# Patient Record
Sex: FEMALE | Race: WHITE | NOT HISPANIC OR LATINO | Employment: FULL TIME | URBAN - METROPOLITAN AREA
[De-identification: names, ages, dates, MRNs, and addresses within clinical notes are randomized per-mention and may not be internally consistent; named-entity substitution may affect disease eponyms.]

---

## 2021-10-28 DIAGNOSIS — M25.561 RIGHT KNEE PAIN, UNSPECIFIED CHRONICITY: Primary | ICD-10-CM

## 2021-10-29 ENCOUNTER — HOSPITAL ENCOUNTER (OUTPATIENT)
Dept: RADIOLOGY | Facility: HOSPITAL | Age: 46
Discharge: HOME | End: 2021-10-29
Attending: FAMILY MEDICINE
Payer: COMMERCIAL

## 2021-10-29 ENCOUNTER — OFFICE VISIT (OUTPATIENT)
Dept: ORTHOPEDICS | Facility: CLINIC | Age: 46
End: 2021-10-29
Payer: COMMERCIAL

## 2021-10-29 DIAGNOSIS — M25.561 RIGHT KNEE PAIN, UNSPECIFIED CHRONICITY: ICD-10-CM

## 2021-10-29 DIAGNOSIS — S83.511A RUPTURE OF ANTERIOR CRUCIATE LIGAMENT OF RIGHT KNEE, INITIAL ENCOUNTER: Primary | ICD-10-CM

## 2021-10-29 DIAGNOSIS — M25.461 EFFUSION OF RIGHT KNEE: ICD-10-CM

## 2021-10-29 PROCEDURE — 73564 XR KNEE ORTHO RIGHT WITH FLEXION: ICD-10-PCS | Mod: 26,RT,, | Performed by: RADIOLOGY

## 2021-10-29 PROCEDURE — 99203 OFFICE O/P NEW LOW 30 MIN: CPT | Mod: S$GLB,,, | Performed by: ORTHOPAEDIC SURGERY

## 2021-10-29 PROCEDURE — 73564 X-RAY EXAM KNEE 4 OR MORE: CPT | Mod: 26,RT,, | Performed by: RADIOLOGY

## 2021-10-29 PROCEDURE — 99999 PR PBB SHADOW E&M-EST. PATIENT-LVL II: ICD-10-PCS | Mod: PBBFAC,,, | Performed by: ORTHOPAEDIC SURGERY

## 2021-10-29 PROCEDURE — 73562 X-RAY EXAM OF KNEE 3: CPT | Mod: 26,LT,, | Performed by: RADIOLOGY

## 2021-10-29 PROCEDURE — 99999 PR PBB SHADOW E&M-EST. PATIENT-LVL II: CPT | Mod: PBBFAC,,, | Performed by: ORTHOPAEDIC SURGERY

## 2021-10-29 PROCEDURE — 99203 PR OFFICE/OUTPT VISIT, NEW, LEVL III, 30-44 MIN: ICD-10-PCS | Mod: S$GLB,,, | Performed by: ORTHOPAEDIC SURGERY

## 2021-10-29 PROCEDURE — 73562 XR KNEE ORTHO RIGHT WITH FLEXION: ICD-10-PCS | Mod: 26,LT,, | Performed by: RADIOLOGY

## 2021-10-29 PROCEDURE — 73564 X-RAY EXAM KNEE 4 OR MORE: CPT | Mod: TC,RT

## 2021-10-29 RX ORDER — ESCITALOPRAM OXALATE 10 MG/1
10 TABLET ORAL DAILY
COMMUNITY
Start: 2021-09-09

## 2021-10-29 RX ORDER — DEXTROAMPHETAMINE SACCHARATE, AMPHETAMINE ASPARTATE, DEXTROAMPHETAMINE SULFATE AND AMPHETAMINE SULFATE 5; 5; 5; 5 MG/1; MG/1; MG/1; MG/1
1 TABLET ORAL 2 TIMES DAILY
COMMUNITY
Start: 2021-10-24

## 2021-11-01 ENCOUNTER — HOSPITAL ENCOUNTER (OUTPATIENT)
Dept: RADIOLOGY | Facility: HOSPITAL | Age: 46
Discharge: HOME | End: 2021-11-01
Attending: ORTHOPAEDIC SURGERY
Payer: COMMERCIAL

## 2021-11-01 DIAGNOSIS — S83.511A RUPTURE OF ANTERIOR CRUCIATE LIGAMENT OF RIGHT KNEE, INITIAL ENCOUNTER: ICD-10-CM

## 2021-11-01 DIAGNOSIS — M89.9 BONE LESION: Primary | ICD-10-CM

## 2021-11-01 DIAGNOSIS — M25.461 EFFUSION OF RIGHT KNEE: ICD-10-CM

## 2021-11-01 PROCEDURE — 73721 MRI JNT OF LWR EXTRE W/O DYE: CPT | Mod: TC,PO,RT

## 2021-11-01 PROCEDURE — 73721 MRI KNEE WITHOUT CONTRAST RIGHT: ICD-10-PCS | Mod: 26,RT,, | Performed by: RADIOLOGY

## 2021-11-01 PROCEDURE — 73721 MRI JNT OF LWR EXTRE W/O DYE: CPT | Mod: 26,RT,, | Performed by: RADIOLOGY

## 2021-11-03 ENCOUNTER — OFFICE VISIT (OUTPATIENT)
Dept: ORTHOPEDICS | Facility: CLINIC | Age: 46
End: 2021-11-03
Payer: COMMERCIAL

## 2021-11-03 DIAGNOSIS — S83.511A RUPTURE OF ANTERIOR CRUCIATE LIGAMENT OF RIGHT KNEE, INITIAL ENCOUNTER: Primary | ICD-10-CM

## 2021-11-03 PROCEDURE — 99213 OFFICE O/P EST LOW 20 MIN: CPT | Mod: S$GLB,,, | Performed by: ORTHOPAEDIC SURGERY

## 2021-11-03 PROCEDURE — 99213 PR OFFICE/OUTPT VISIT, EST, LEVL III, 20-29 MIN: ICD-10-PCS | Mod: S$GLB,,, | Performed by: ORTHOPAEDIC SURGERY

## 2021-11-03 PROCEDURE — 99999 PR PBB SHADOW E&M-EST. PATIENT-LVL III: CPT | Mod: PBBFAC,,, | Performed by: ORTHOPAEDIC SURGERY

## 2021-11-03 PROCEDURE — 99999 PR PBB SHADOW E&M-EST. PATIENT-LVL III: ICD-10-PCS | Mod: PBBFAC,,, | Performed by: ORTHOPAEDIC SURGERY

## 2021-11-10 ENCOUNTER — CLINICAL SUPPORT (OUTPATIENT)
Dept: REHABILITATION | Facility: HOSPITAL | Age: 46
End: 2021-11-10
Attending: ORTHOPAEDIC SURGERY
Payer: COMMERCIAL

## 2021-11-10 DIAGNOSIS — S83.511A RUPTURE OF ANTERIOR CRUCIATE LIGAMENT OF RIGHT KNEE, INITIAL ENCOUNTER: ICD-10-CM

## 2021-11-10 DIAGNOSIS — M25.561 ACUTE PAIN OF RIGHT KNEE: ICD-10-CM

## 2021-11-10 DIAGNOSIS — M25.661 JOINT STIFFNESS OF KNEE, RIGHT: ICD-10-CM

## 2021-11-10 PROCEDURE — 97161 PT EVAL LOW COMPLEX 20 MIN: CPT

## 2021-11-10 PROCEDURE — 97110 THERAPEUTIC EXERCISES: CPT

## 2021-11-10 PROCEDURE — 97140 MANUAL THERAPY 1/> REGIONS: CPT

## 2021-11-18 ENCOUNTER — CLINICAL SUPPORT (OUTPATIENT)
Dept: REHABILITATION | Facility: HOSPITAL | Age: 46
End: 2021-11-18
Payer: COMMERCIAL

## 2021-11-18 DIAGNOSIS — M25.661 JOINT STIFFNESS OF KNEE, RIGHT: ICD-10-CM

## 2021-11-18 DIAGNOSIS — M25.561 ACUTE PAIN OF RIGHT KNEE: ICD-10-CM

## 2021-11-18 PROCEDURE — 97140 MANUAL THERAPY 1/> REGIONS: CPT

## 2021-11-18 PROCEDURE — 97110 THERAPEUTIC EXERCISES: CPT

## 2021-11-19 ENCOUNTER — CLINICAL SUPPORT (OUTPATIENT)
Dept: REHABILITATION | Facility: HOSPITAL | Age: 46
End: 2021-11-19
Payer: COMMERCIAL

## 2021-11-19 DIAGNOSIS — M25.661 JOINT STIFFNESS OF KNEE, RIGHT: ICD-10-CM

## 2021-11-19 DIAGNOSIS — M25.561 ACUTE PAIN OF RIGHT KNEE: ICD-10-CM

## 2021-11-19 PROCEDURE — 97140 MANUAL THERAPY 1/> REGIONS: CPT

## 2021-11-19 PROCEDURE — 97110 THERAPEUTIC EXERCISES: CPT

## 2021-11-23 ENCOUNTER — CLINICAL SUPPORT (OUTPATIENT)
Dept: REHABILITATION | Facility: HOSPITAL | Age: 46
End: 2021-11-23
Payer: COMMERCIAL

## 2021-11-23 DIAGNOSIS — M25.661 JOINT STIFFNESS OF KNEE, RIGHT: ICD-10-CM

## 2021-11-23 DIAGNOSIS — M25.561 ACUTE PAIN OF RIGHT KNEE: ICD-10-CM

## 2021-11-23 PROCEDURE — 97110 THERAPEUTIC EXERCISES: CPT

## 2021-11-23 PROCEDURE — 97140 MANUAL THERAPY 1/> REGIONS: CPT

## 2021-11-24 ENCOUNTER — PATIENT MESSAGE (OUTPATIENT)
Dept: ORTHOPEDICS | Facility: CLINIC | Age: 46
End: 2021-11-24
Payer: COMMERCIAL

## 2021-11-24 ENCOUNTER — TELEPHONE (OUTPATIENT)
Dept: ORTHOPEDICS | Facility: CLINIC | Age: 46
End: 2021-11-24
Payer: COMMERCIAL

## 2021-12-02 ENCOUNTER — OFFICE VISIT (OUTPATIENT)
Dept: ORTHOPEDICS | Facility: CLINIC | Age: 46
End: 2021-12-02
Payer: COMMERCIAL

## 2021-12-02 VITALS — HEIGHT: 65 IN | WEIGHT: 150 LBS | BODY MASS INDEX: 24.99 KG/M2

## 2021-12-02 DIAGNOSIS — S83.511D RUPTURE OF ANTERIOR CRUCIATE LIGAMENT OF RIGHT KNEE, SUBSEQUENT ENCOUNTER: Primary | ICD-10-CM

## 2021-12-02 PROCEDURE — 99213 PR OFFICE/OUTPT VISIT, EST, LEVL III, 20-29 MIN: ICD-10-PCS | Mod: S$GLB,,, | Performed by: ORTHOPAEDIC SURGERY

## 2021-12-02 PROCEDURE — 99999 PR PBB SHADOW E&M-EST. PATIENT-LVL III: CPT | Mod: PBBFAC,,, | Performed by: ORTHOPAEDIC SURGERY

## 2021-12-02 PROCEDURE — 99213 OFFICE O/P EST LOW 20 MIN: CPT | Mod: S$GLB,,, | Performed by: ORTHOPAEDIC SURGERY

## 2021-12-02 PROCEDURE — 99999 PR PBB SHADOW E&M-EST. PATIENT-LVL III: ICD-10-PCS | Mod: PBBFAC,,, | Performed by: ORTHOPAEDIC SURGERY

## 2021-12-07 ENCOUNTER — CLINICAL SUPPORT (OUTPATIENT)
Dept: REHABILITATION | Facility: HOSPITAL | Age: 46
End: 2021-12-07
Payer: COMMERCIAL

## 2021-12-07 DIAGNOSIS — M25.661 JOINT STIFFNESS OF KNEE, RIGHT: ICD-10-CM

## 2021-12-07 DIAGNOSIS — M25.561 ACUTE PAIN OF RIGHT KNEE: ICD-10-CM

## 2021-12-07 PROCEDURE — 97110 THERAPEUTIC EXERCISES: CPT

## 2021-12-07 PROCEDURE — 97140 MANUAL THERAPY 1/> REGIONS: CPT

## 2021-12-14 ENCOUNTER — CLINICAL SUPPORT (OUTPATIENT)
Dept: REHABILITATION | Facility: HOSPITAL | Age: 46
End: 2021-12-14
Payer: COMMERCIAL

## 2021-12-14 DIAGNOSIS — M25.661 JOINT STIFFNESS OF KNEE, RIGHT: ICD-10-CM

## 2021-12-14 DIAGNOSIS — M25.561 ACUTE PAIN OF RIGHT KNEE: ICD-10-CM

## 2021-12-14 PROCEDURE — 97110 THERAPEUTIC EXERCISES: CPT

## 2021-12-14 PROCEDURE — 97140 MANUAL THERAPY 1/> REGIONS: CPT

## 2021-12-16 ENCOUNTER — CLINICAL SUPPORT (OUTPATIENT)
Dept: REHABILITATION | Facility: HOSPITAL | Age: 46
End: 2021-12-16
Payer: COMMERCIAL

## 2021-12-16 DIAGNOSIS — M25.661 JOINT STIFFNESS OF KNEE, RIGHT: ICD-10-CM

## 2021-12-16 DIAGNOSIS — M25.561 ACUTE PAIN OF RIGHT KNEE: ICD-10-CM

## 2021-12-16 PROCEDURE — 97140 MANUAL THERAPY 1/> REGIONS: CPT

## 2021-12-16 PROCEDURE — 97110 THERAPEUTIC EXERCISES: CPT

## 2021-12-23 ENCOUNTER — CLINICAL SUPPORT (OUTPATIENT)
Dept: REHABILITATION | Facility: HOSPITAL | Age: 46
End: 2021-12-23
Payer: COMMERCIAL

## 2021-12-23 DIAGNOSIS — M25.661 JOINT STIFFNESS OF KNEE, RIGHT: ICD-10-CM

## 2021-12-23 DIAGNOSIS — M25.561 ACUTE PAIN OF RIGHT KNEE: ICD-10-CM

## 2021-12-23 PROCEDURE — 97110 THERAPEUTIC EXERCISES: CPT

## 2021-12-28 ENCOUNTER — CLINICAL SUPPORT (OUTPATIENT)
Dept: REHABILITATION | Facility: HOSPITAL | Age: 46
End: 2021-12-28
Payer: COMMERCIAL

## 2021-12-28 DIAGNOSIS — M25.561 ACUTE PAIN OF RIGHT KNEE: ICD-10-CM

## 2021-12-28 DIAGNOSIS — M25.661 JOINT STIFFNESS OF KNEE, RIGHT: ICD-10-CM

## 2021-12-28 PROCEDURE — 97110 THERAPEUTIC EXERCISES: CPT

## 2021-12-30 ENCOUNTER — CLINICAL SUPPORT (OUTPATIENT)
Dept: REHABILITATION | Facility: HOSPITAL | Age: 46
End: 2021-12-30
Payer: COMMERCIAL

## 2021-12-30 DIAGNOSIS — M25.561 ACUTE PAIN OF RIGHT KNEE: ICD-10-CM

## 2021-12-30 DIAGNOSIS — M25.661 JOINT STIFFNESS OF KNEE, RIGHT: ICD-10-CM

## 2021-12-30 PROCEDURE — 97110 THERAPEUTIC EXERCISES: CPT

## 2022-01-06 ENCOUNTER — CLINICAL SUPPORT (OUTPATIENT)
Dept: REHABILITATION | Facility: HOSPITAL | Age: 47
End: 2022-01-06
Payer: COMMERCIAL

## 2022-01-06 DIAGNOSIS — M25.661 JOINT STIFFNESS OF KNEE, RIGHT: ICD-10-CM

## 2022-01-06 DIAGNOSIS — M25.561 ACUTE PAIN OF RIGHT KNEE: ICD-10-CM

## 2022-01-06 PROCEDURE — 97110 THERAPEUTIC EXERCISES: CPT

## 2022-01-06 NOTE — PROGRESS NOTES
Outpatient Therapy Updated Plan of Care     Visit Date: 1/6/2022    Name: Lizeth Willis  Clinic Number: [id number]    Therapy Diagnosis:   Encounter Diagnoses   Name Primary?    Joint stiffness of knee, right     Acute pain of right knee      Physician: Parth Bertrand MD    Physician Orders: PT Eval and Treat  Medical Diagnosis from Referral: Rupture of anterior cruciate ligament of right knee, initial encounter [S83.511A]  Evaluation Date: 11/10/2021  Authorization Period Expiration: 11/03/2022  Plan of Care Expiration: 01/05/2022  Progress Note Due: 01/15/2022  Visit # / Visits authorized: 1/20 (11 Episode Visits)    Current Certification Period:  01/01/2022 to 06/01/2022    Precautions: Standard  Functional Level Prior to Evaluation:  Patient was independent with all ADLs. Patient was able to tolerate all exercise, walking, and other day to day activities without pain and/or feelings of instability.    Subjective     Update: Currently, Lizeth feels that she is progressing well toward her goals. She believes she still remains limited with some of her usual ADLs that require balance and or single leg strength. Additionally, she has some feelings of instability when walking her dog - albeit they are decreasing.    Objective     Update:     Range of Motion:      Knee Right active Right Passive Left Active Left passive Goal   Flexion 140 142 145 147 135 deg.   Extension Missing 2 degrees. 3 degrees hyper 3 degrees hyper 3 degrees hyper 0 deg.         Lower Extremity Strength:     Right LE   Left LE   Goal   Knee extension: 4/5 Knee extension: 4/5 5/5   Knee flexion: 4/5 Knee flexion: 4/5 5/5   Hip flexion: 4/5 Hip flexion: 4/5 5/5   Hip extension:  4/5 Hip extension: 4/5 5/5   Hip abduction: 4/5 Hip abduction: 4/5 5/5            Assessment     Update: Lizeth is progressing well with physical therapy, with no complaints of pain or discomfort and significant improvements noted in lateral hip stability  and gross lower extremity strength and range of motion since initial evaluation; please see exam for details. Lizeth continues to have difficulty with achieving full active flexion and lacks with balance and gross lower extremity strength.  FOTO scores noted above indicate the patients perceived functional levels are improving since prior assessment. The above impairments and functional deficits will continue to be addressed over the course of this plan of care. Lizeth was educated on continued progression of PT, expectations for the duration of care, updates on goals set at initial evaluation, and home exercise program.  Lizeth will continue to benefit from physical therapy in order to further address goals, maximize function and quality of life.    Goals:     SHORT TERM GOALS:  4 weeks 11/10/2021   1. Recent signs and systems trend is improving in order to progress towards LTG's.  MET   2. Patient will be independent with HEP in order to further progress and return to maximal function.  MET   3. Pain rating at Worst: 5/10 in order to progress towards increased independence with activity.  MET   4. Patient will be able to correct postural deviations in sitting and standing, to decrease pain and promote postural awareness for injury prevention.   MET      LONG TERM GOALS: 8 weeks 11/10/2021   1. Patient will return to normal ADL, recreational, and work related activities with less pain and limitation.  MET    2. Patient will improve AROM to stated goals in order to return to maximal functional potential.  Partially Met    3. Patient will improve Strength to stated goals of appropriate musculature in order to improve functional independence.   Partially Met   4. Pain Rating at Best: 1/10 to improve Quality of Life.   MET   5. Patient will meet predicted functional outcome (FOTO) score: 78% to increase self-worth & perceived functional ability.  MET   6. Patient will have met/partially met personal goal of:  returning to walking for leisure.  MET        Long Term Goal Status:   continue per initial plan of care.  Reasons for Recertification of Therapy:   Pt will continue to benefit from skilled outpatient physical therapy to address the deficits listed in the problem list box on initial evaluation, provide pt/family education and to maximize pt's level of independence in the home and community environment.     Plan     Updated Certification Period: 1/6/2022 to 01/21/2022  Recommended Treatment Plan: 2 times per week for 2 weeks: Gait Training, Manual Therapy, Moist Heat/ Ice, Neuromuscular Re-ed, Patient Education, Self Care, Therapeutic Exercise and Therapeutic Activites    Efrain Perales, PT  1/6/2022      I CERTIFY THE NEED FOR THESE SERVICES FURNISHED UNDER THIS PLAN OF TREATMENT AND WHILE UNDER MY CARE    Physician's comments:        Physician's Signature: ___________________________________________________

## 2022-01-06 NOTE — PROGRESS NOTES
[facility]   Physical Therapy Daily Note     Name: Lizeth Willis  Clinic Number: [id number]    Therapy Diagnosis:   Encounter Diagnoses   Name Primary?    Joint stiffness of knee, right     Acute pain of right knee      Physician: Parth Bertrand MD    Visit Date: 1/6/2022    Physician Orders: PT Eval and Treat  Medical Diagnosis from Referral: Rupture of anterior cruciate ligament of right knee, initial encounter [S83.511A]  Evaluation Date: 11/10/2021  Authorization Period Expiration: 11/03/2022  Plan of Care Expiration: 01/05/2022  Progress Note Due: 01/15/2022  Visit # / Visits authorized: 1/20 (New Year) - 10 Episode Visits  FOTO: 2/ 3      Precautions: Standard    PTA Visit #: 0/5     Time In: 9:10 am  Time Out: 9:53 am   Total Billable Time: 39 minutes     SUBJECTIVE     Patient reports: some soreness in her right knee with the weather change but overall she is continuing to feel progression  He/She was compliant with home exercise program.  Response to previous treatment: minimal to no muscle soreness  Functional change: some improvement in extension and with walking her dog    Pain: 0/10     Location: right knee    OBJECTIVE     Objective Measures updated at progress report unless specified.     TREATMENT     Lizeth received the treatments listed below:     MANUAL THERAPY TECHNIQUES including Joint mobilizations were applied to right knee for 0 minutes.    Manual Intervention Performed Today    Instrument Assisted Soft-Tissue Mobilization   left hamstrings, left quadriceps, left peripatellar region    Joint Mobilizations  Tibiofemoral extension ; patellar mobilization all directions   Mobilization with movement          Functional Dry Needling       Plan for Next Visit: prn       THERAPEUTIC EXERCISES to develop  strength, endurance, ROM, flexibility, posture and core stabilization for 39 minutes including assessment, patient education, and the following  "exercises.    Intervention    Performed Today Sets/Reps/Resistance     Upright Bike x 3 min - Level 3 - Lower Extremity endurance and strength   Supine Hangs - Chair Hang  5 min - 5# KB   Prone Hamstring Curls x 3x10 - 3#   Side lying clamshells x 3x10 - Green Theraband    Glute Bridge x 3x10 - Green Theraband    side lying hip abduction  2x10 - 2#    Straight Leg Raise  2x12 - 2#   Nautilus Knee Extension x 3x8 - up with 2 down with 1 - 1 plate   Nautilus Hamstring Curls x 3x8 - 0 Plates   Step Ups  8" Step - 3x10   Monster Walks x 3 Laps Blue Theraband ~ 20 feet    Lateral Band Walks x 3 Laps - Blue Theraband - ~20 feet    Single Leg Squat to Box x 22" - 3x12 - with kickstand    Squats to Box + Theraband x 3x10 - Blue Theraband - 20"   Single Leg Balance + MOBO x 2x30" - Evens and Odds    Split Squats x 3x6 - each    Backwards walking to improve extension  3 Laps - 50 feet - Turf   Gastroc Stretch and Soleus Stretch - Slantboard x 2x30" - right      Plan for Next Visit:        PATIENT EDUCATION AND HOME EXERCISES     Home Exercises Provided and Patient Education Provided     Education provided: (included in therex) minutes  Importance of achieving full knee extension during gait  Progression of home exercise program     Written Home Exercises Provided: continue prior home exercise program .  Exercises were reviewed and Lizeth was able to demonstrate them prior to the end of the session.  Lizeth demonstrated good  understanding of the education provided. See EMR under Patient Instructions for exercises provided during therapy sessions.      ASSESSMENT     Patient tolerated the addition of all exercises with reports of only muscle fatigue. Addition of hamstring curl in order to improve hamstring strength. Addition of split squats in order to improve functional lower extremity strength. Continue to progress plan of care as tolerated and begin to discuss discharge planning.    Lizeth is progressing well towards " her goals.   Pt prognosis is Excellent.     Pt will continue to benefit from skilled outpatient physical therapy to address the deficits listed in the problem list box on initial evaluation, provide pt/family education and to maximize pt's level of independence in the home and community environment.     Pt's spiritual, cultural and educational needs considered and pt agreeable to plan of care and goals.     Anticipated Barriers for therapy: coping style    SHORT TERM GOALS:  4 weeks 11/10/2021   1. Recent signs and systems trend is improving in order to progress towards LTG's.  PC   2. Patient will be independent with HEP in order to further progress and return to maximal function.  PC   3. Pain rating at Worst: 5/10 in order to progress towards increased independence with activity.  PC   4. Patient will be able to correct postural deviations in sitting and standing, to decrease pain and promote postural awareness for injury prevention.   PC      LONG TERM GOALS: 8 weeks 11/10/2021   1. Patient will return to normal ADL, recreational, and work related activities with less pain and limitation.   PC   2. Patient will improve AROM to stated goals in order to return to maximal functional potential.   PC   3. Patient will improve Strength to stated goals of appropriate musculature in order to improve functional independence.   PC   4. Pain Rating at Best: 1/10 to improve Quality of Life.   PC   5. Patient will meet predicted functional outcome (FOTO) score: 78% to increase self-worth & perceived functional ability.  PC   6. Patient will have met/partially met personal goal of: returning to walking for leisure.  PC         Goals Key:  PC= progressing/continue; PM= partially met;        DC= discontinue    PLAN     Continue Plan of Care (POC) and progress per patient tolerance. See treatment section for details on planned progressions next session.      Efrain Perales, PT

## 2022-01-13 ENCOUNTER — CLINICAL SUPPORT (OUTPATIENT)
Dept: REHABILITATION | Facility: HOSPITAL | Age: 47
End: 2022-01-13
Payer: COMMERCIAL

## 2022-01-13 DIAGNOSIS — M25.661 JOINT STIFFNESS OF KNEE, RIGHT: ICD-10-CM

## 2022-01-13 DIAGNOSIS — M25.561 ACUTE PAIN OF RIGHT KNEE: ICD-10-CM

## 2022-01-13 PROCEDURE — 97110 THERAPEUTIC EXERCISES: CPT

## 2022-01-13 NOTE — PROGRESS NOTES
[facility]   Physical Therapy Daily Note     Name: Lizeth Willis  Clinic Number: [id number]    Therapy Diagnosis:   Encounter Diagnoses   Name Primary?    Joint stiffness of knee, right     Acute pain of right knee      Physician: Parth Bertrand MD    Visit Date: 1/13/2022    Physician Orders: PT Eval and Treat  Medical Diagnosis from Referral: Rupture of anterior cruciate ligament of right knee, initial encounter [S83.511A]  Evaluation Date: 11/10/2021  Authorization Period Expiration: 11/03/2022  Plan of Care Expiration: 06/01/2022  Progress Note Due: 01/15/2022  Visit # / Visits authorized: 2/20 (New Year) - 11 Episode Visits  FOTO: 2/ 3      Precautions: Standard    PTA Visit #: 0/5     Time In: 9:10 am  Time Out: 9:55 am   Total Billable Time: 38 minutes     SUBJECTIVE     Patient reports: patient reports that her knee has been doing well - no adverse reactions to last treatment session.    He/She was compliant with home exercise program.  Response to previous treatment: minimal to no muscle soreness  Functional change: some improvement in extension and with walking her dog    Pain: 0/10     Location: right knee    OBJECTIVE     Objective Measures updated at progress report unless specified.     TREATMENT     Lizeth received the treatments listed below:     MANUAL THERAPY TECHNIQUES including Joint mobilizations were applied to right knee for 0 minutes.    Manual Intervention Performed Today    Instrument Assisted Soft-Tissue Mobilization   left hamstrings, left quadriceps, left peripatellar region    Joint Mobilizations  Tibiofemoral extension ; patellar mobilization all directions   Mobilization with movement          Functional Dry Needling       Plan for Next Visit: prn       THERAPEUTIC EXERCISES to develop  strength, endurance, ROM, flexibility, posture and core stabilization for 38 minutes including assessment, patient education, and the following  "exercises.    Intervention    Performed Today Sets/Reps/Resistance     Upright Bike x 5 min - Level 5 - Lower Extremity endurance and strength   Supine Hangs - Chair Hang  5 min - 5# KB   Prone Hamstring Curls  3x10 - 3#   Side lying clamshells x 3x10 - Blue Theraband    Glute Bridge  3x10 - Green Theraband    side lying hip abduction  2x10 - 2#    Straight Leg Raise  2x12 - 2#   Nautilus Knee Extension x 3x8 - 2 Plates   Nautilus Hamstring Curls HELD 3x8 - 0 Plates   Step Down - heel tap x 4" Step - 3x8   Monster Walks x 3 Laps Blue Theraband ~ 20 feet    Lateral Band Walks x 3 Laps - Blue Theraband - ~20 feet    Single Leg Squat to Box x 24" - 3x8 - without kickstand    Squats to Box + Theraband + Kettlebell x 3x10 - Blue Theraband - 18" - 15# KB   Single Leg Balance + Kettlebell Pass x 2x20    Single Leg Balance + MOBO x 2x30" - Evens and Odds    Split Squats x 1x8 - each    Backwards walking to improve extension x 2 Laps - 50 feet - Turf   Gastroc Stretch and Soleus Stretch - Slantboard x 2x30" - right      Plan for Next Visit:        PATIENT EDUCATION AND HOME EXERCISES     Home Exercises Provided and Patient Education Provided     Education provided: (included in therex) minutes  Importance of achieving full knee extension during gait  Progression of home exercise program     Written Home Exercises Provided: continue prior home exercise program .  Exercises were reviewed and Lizeth was able to demonstrate them prior to the end of the session.  Lizeth demonstrated good  understanding of the education provided. See EMR under Patient Instructions for exercises provided during therapy sessions.      ASSESSMENT     Patient tolerated the addition of all exercises with reports of only muscle fatigue. Patient has made significant progress since her initial evaluation. Progressing home exercise program today - patient will perform home exercise program next week and come for one more follow up visit to be " discharged.    Lizeth is progressing well towards her goals.     Pt prognosis is Excellent.     Pt will continue to benefit from skilled outpatient physical therapy to address the deficits listed in the problem list box on initial evaluation, provide pt/family education and to maximize pt's level of independence in the home and community environment.     Pt's spiritual, cultural and educational needs considered and pt agreeable to plan of care and goals.     Anticipated Barriers for therapy: coping style    SHORT TERM GOALS:  4 weeks 11/10/2021   1. Recent signs and systems trend is improving in order to progress towards LTG's.  PC   2. Patient will be independent with HEP in order to further progress and return to maximal function.  PC   3. Pain rating at Worst: 5/10 in order to progress towards increased independence with activity.  PC   4. Patient will be able to correct postural deviations in sitting and standing, to decrease pain and promote postural awareness for injury prevention.   PC      LONG TERM GOALS: 8 weeks 11/10/2021   1. Patient will return to normal ADL, recreational, and work related activities with less pain and limitation.   PC   2. Patient will improve AROM to stated goals in order to return to maximal functional potential.   PC   3. Patient will improve Strength to stated goals of appropriate musculature in order to improve functional independence.   PC   4. Pain Rating at Best: 1/10 to improve Quality of Life.   PC   5. Patient will meet predicted functional outcome (FOTO) score: 78% to increase self-worth & perceived functional ability.  PC   6. Patient will have met/partially met personal goal of: returning to walking for leisure.  PC         Goals Key:  PC= progressing/continue; PM= partially met;        DC= discontinue    PLAN     Continue Plan of Care (POC) and progress per patient tolerance. See treatment section for details on planned progressions next session.      Efrain Perales  PT

## 2022-01-31 ENCOUNTER — CLINICAL SUPPORT (OUTPATIENT)
Dept: REHABILITATION | Facility: HOSPITAL | Age: 47
End: 2022-01-31
Payer: COMMERCIAL

## 2022-01-31 DIAGNOSIS — M25.561 ACUTE PAIN OF RIGHT KNEE: ICD-10-CM

## 2022-01-31 DIAGNOSIS — M25.661 JOINT STIFFNESS OF KNEE, RIGHT: ICD-10-CM

## 2022-01-31 PROCEDURE — 97110 THERAPEUTIC EXERCISES: CPT

## 2022-01-31 NOTE — PROGRESS NOTES
[facility]   Physical Therapy Progress Note     Name: Lizeth Willis  Clinic Number: [id number]    Therapy Diagnosis:   Encounter Diagnoses   Name Primary?    Joint stiffness of knee, right     Acute pain of right knee      Physician: Parth Bertrand MD    Visit Date: 1/31/2022    Physician Orders: PT Eval and Treat  Medical Diagnosis from Referral: Rupture of anterior cruciate ligament of right knee, initial encounter [S83.511A]  Evaluation Date: 11/10/2021  Authorization Period Expiration: 11/03/2022  Plan of Care Expiration: 06/01/2022  Progress Note Due: 03/02/2022  Visit # / Visits authorized: 3/20 (New Year) - 12 Episode Visits  FOTO: 2/ 3      Precautions: Standard    PTA Visit #: 0/5     Time In: 2:15 pm  Time Out: 2:57 pm   Total Billable Time: 38 minutes     SUBJECTIVE     Patient reports: patient reports that her knee has started bothering her again - she did a lot of work in her garage and she believes that is the reason.    He/She was partially compliant with home exercise program.  Response to previous treatment: minimal to no muscle soreness  Functional change: some improvement in extension and with walking her dog    Pain: 0/10     Location: right knee    OBJECTIVE     Objective Measures updated at progress report unless specified.     Range of Motion:      Knee Right active Right Passive Left Active Left passive Goal   Flexion 125 130 145 147 135 deg.   Extension 3 degrees hyper 3 degrees hyper 3 degrees hyper 3 degrees hyper 0 deg.         Lower Extremity Strength:     Right LE   Left LE   Goal   Knee extension: 4-/5 Knee extension: 4/5 5/5   Knee flexion: 4-/5 Knee flexion: 4/5 5/5   Hip flexion: 4-/5 Hip flexion: 4/5 5/5   Hip extension:  4-/5 Hip extension: 4/5 5/5   Hip abduction: 4-/5 Hip abduction: 4-/5 5/5          TREATMENT     Lizeth received the treatments listed below:     MANUAL THERAPY TECHNIQUES including Joint mobilizations were applied to right  "knee for 0 minutes.    Manual Intervention Performed Today    Instrument Assisted Soft-Tissue Mobilization   left hamstrings, left quadriceps, left peripatellar region    Joint Mobilizations  Tibiofemoral extension ; patellar mobilization all directions   Mobilization with movement          Functional Dry Needling       Plan for Next Visit: prn       THERAPEUTIC EXERCISES to develop  strength, endurance, ROM, flexibility, posture and core stabilization for 38 minutes including assessment, patient education, and the following exercises.    Intervention    Performed Today Sets/Reps/Resistance     Upright Bike x 5 min - Level 5 - Lower Extremity endurance and strength   Supine Hangs - Chair Hang x 5 min - 5# KB   Prone Hamstring Curls  3x10 - 3#   Side lying clamshells  3x10 - Blue Theraband    Glute Bridge  3x10 - Green Theraband    side lying hip abduction  2x10 - 2#    Long arc quad x 3x10 - 5#   Straight Leg Raise x 2x12 - 2#   Nautilus Knee Extension  3x8 - 2 Plates   Nautilus Hamstring Curls  3x8 - 0 Plates   Step Down - heel tap  4" Step - 3x8   Monster Walks x 3 Laps Blue Theraband ~ 20 feet    Lateral Band Walks x 3 Laps - Blue Theraband - ~20 feet    Single Leg Squat to Box x 24" - 3x8 - without kickstand    Squats to Box + Theraband + Kettlebell  3x10 - Blue Theraband - 18" - 15# KB   Single Leg Balance + Kettlebell Pass  2x20    Single Leg Balance + MOBO x 2x30" - Evens and Odds    Split Squats  1x8 - each    Backwards walking to improve extension  2 Laps - 50 feet - Turf   Gastroc Stretch and Soleus Stretch - Slantboard  2x30" - right      Plan for Next Visit:        PATIENT EDUCATION AND HOME EXERCISES     Home Exercises Provided and Patient Education Provided     Education provided: (included in therex) minutes  Importance of achieving full knee extension during gait  Progression of home exercise program     Written Home Exercises Provided: continue prior home exercise program .  Exercises were reviewed " and Lizeth was able to demonstrate them prior to the end of the session.  Lizeth demonstrated good  understanding of the education provided. See EMR under Patient Instructions for exercises provided during therapy sessions.      ASSESSMENT     Patient tolerated the addition of all exercises with reports of only muscle fatigue and discomfort behind her knee. Patient has not been to therapy in approximately two weeks and admits that she has not been as compliant with her home exercise program as she should be. Goal for therapy will be to increase strength and range of motion as tolerated in order to prepare her for discharge. Continue to progress plan of care as tolerated.    Lizeth is progressing well towards her goals.     Pt prognosis is Excellent.     Pt will continue to benefit from skilled outpatient physical therapy to address the deficits listed in the problem list box on initial evaluation, provide pt/family education and to maximize pt's level of independence in the home and community environment.     Pt's spiritual, cultural and educational needs considered and pt agreeable to plan of care and goals.     Anticipated Barriers for therapy: coping style    SHORT TERM GOALS:  4 weeks 11/10/2021   1. Recent signs and systems trend is improving in order to progress towards LTG's.  PC   2. Patient will be independent with HEP in order to further progress and return to maximal function.  PC   3. Pain rating at Worst: 5/10 in order to progress towards increased independence with activity.  PC   4. Patient will be able to correct postural deviations in sitting and standing, to decrease pain and promote postural awareness for injury prevention.   PC      LONG TERM GOALS: 8 weeks 11/10/2021   1. Patient will return to normal ADL, recreational, and work related activities with less pain and limitation.   PC   2. Patient will improve AROM to stated goals in order to return to maximal functional potential.   PC    3. Patient will improve Strength to stated goals of appropriate musculature in order to improve functional independence.   PC   4. Pain Rating at Best: 1/10 to improve Quality of Life.   PC   5. Patient will meet predicted functional outcome (FOTO) score: 78% to increase self-worth & perceived functional ability.  PC   6. Patient will have met/partially met personal goal of: returning to walking for leisure.  PC         Goals Key:  PC= progressing/continue; PM= partially met;        DC= discontinue    PLAN     Continue Plan of Care (POC) and progress per patient tolerance. See treatment section for details on planned progressions next session.      Efrain Perales, PT

## 2022-02-08 ENCOUNTER — CLINICAL SUPPORT (OUTPATIENT)
Dept: REHABILITATION | Facility: HOSPITAL | Age: 47
End: 2022-02-08
Payer: COMMERCIAL

## 2022-02-08 DIAGNOSIS — M25.661 JOINT STIFFNESS OF KNEE, RIGHT: ICD-10-CM

## 2022-02-08 DIAGNOSIS — M25.561 ACUTE PAIN OF RIGHT KNEE: ICD-10-CM

## 2022-02-08 PROCEDURE — 97110 THERAPEUTIC EXERCISES: CPT

## 2022-02-08 NOTE — PROGRESS NOTES
"[facility]   Physical Therapy Daily Note     Name: Lizeth Willis  Clinic Number: [id number]    Therapy Diagnosis:   Encounter Diagnoses   Name Primary?    Joint stiffness of knee, right     Acute pain of right knee      Physician: Parth Bertrand MD    Visit Date: 2/8/2022    Physician Orders: PT Eval and Treat  Medical Diagnosis from Referral: Rupture of anterior cruciate ligament of right knee, initial encounter [S83.511A]  Evaluation Date: 11/10/2021  Authorization Period Expiration: 11/03/2022  Plan of Care Expiration: 06/01/2022  Progress Note Due: 03/02/2022  Visit # / Visits authorized: 4/20 (New Year) - 13 Episode Visits  FOTO: 2/ 3      Precautions: Standard    PTA Visit #: 0/5     Time In: 9:25 am  Time Out: 10:14 am    Total Billable Time: 42 minutes     SUBJECTIVE     Patient reports: that her knees has improved since last visit - she reports that when attempting to extend her knee it "popped" and then was able to fully extend.    He/She was compliant with home exercise program.  Response to previous treatment: minimal to no muscle soreness  Functional change: some improvement in extension and with walking her dog    Pain: 0/10     Location: right knee    OBJECTIVE     Objective Measures updated at progress report unless specified.     TREATMENT     Lizeth received the treatments listed below:     MANUAL THERAPY TECHNIQUES including Joint mobilizations were applied to right knee for 0 minutes.    Manual Intervention Performed Today    Instrument Assisted Soft-Tissue Mobilization   left hamstrings, left quadriceps, left peripatellar region    Joint Mobilizations  Tibiofemoral extension ; patellar mobilization all directions   Mobilization with movement          Functional Dry Needling       Plan for Next Visit: prn       THERAPEUTIC EXERCISES to develop  strength, endurance, ROM, flexibility, posture and core stabilization for 42 minutes including assessment, patient " "education, and the following exercises.    Intervention    Performed Today Sets/Reps/Resistance     Upright Bike x 5 min - Level 5 - Lower Extremity endurance and strength   Supine Hangs - Chair Hang x 5 min - 5# KB   Standing Wall Clamshell x 2x10 - Yellow Theraband    Quad Sets x 30x - 3 seconds    Nautilus Knee Extension  3x8 - 2 Plates   Step Ups + KB Contralateral  x 2x12 - 8" - 15# KB    Step Down - heel tap  4" Step - 3x8   Monster Walks x 3 Laps Blue Theraband ~ 20 feet    Lateral Band Walks x 3 Laps - Blue Theraband - ~20 feet    Single Leg Squat to Box x 22" - 3x8 - without kickstand    Squats to Box + Theraband + Kettlebell x 3x10 - Blue Theraband - 20" - 15# KB   Single Leg Balance + Kettlebell Pass  2x20    Single Leg Balance + MOBO X  x 2x30" - Evens and Odds    Split Squats x 3x8 - each    Backwards walking to improve extension  2 Laps - 50 feet - Turf   Gastroc Stretch and Soleus Stretch - Slantboard  2x30" - right      Plan for Next Visit:        PATIENT EDUCATION AND HOME EXERCISES     Home Exercises Provided and Patient Education Provided     Education provided: (included in therex) minutes  Importance of achieving full knee extension during gait  Progression of home exercise program     Written Home Exercises Provided: continue prior home exercise program .  Exercises were reviewed and Lizeth was able to demonstrate them prior to the end of the session.  Lizeth demonstrated good  understanding of the education provided. See EMR under Patient Instructions for exercises provided during therapy sessions.      ASSESSMENT     Patient tolerated the addition of all exercises with reports of only muscle fatigue. Addition of wall clamshells in order to promote lateral hip stability and balance. Re-incorporation of split squats in order to promote functional lower extremity strengthening. Continue to progress plan of care as tolerated.    Lizeth is progressing well towards her goals.     Pt prognosis " is Excellent.     Pt will continue to benefit from skilled outpatient physical therapy to address the deficits listed in the problem list box on initial evaluation, provide pt/family education and to maximize pt's level of independence in the home and community environment.     Pt's spiritual, cultural and educational needs considered and pt agreeable to plan of care and goals.     Anticipated Barriers for therapy: coping style    SHORT TERM GOALS:  4 weeks 11/10/2021   1. Recent signs and systems trend is improving in order to progress towards LTG's.  PC   2. Patient will be independent with HEP in order to further progress and return to maximal function.  PC   3. Pain rating at Worst: 5/10 in order to progress towards increased independence with activity.  PC   4. Patient will be able to correct postural deviations in sitting and standing, to decrease pain and promote postural awareness for injury prevention.   PC      LONG TERM GOALS: 8 weeks 11/10/2021   1. Patient will return to normal ADL, recreational, and work related activities with less pain and limitation.   PC   2. Patient will improve AROM to stated goals in order to return to maximal functional potential.   PC   3. Patient will improve Strength to stated goals of appropriate musculature in order to improve functional independence.   PC   4. Pain Rating at Best: 1/10 to improve Quality of Life.   PC   5. Patient will meet predicted functional outcome (FOTO) score: 78% to increase self-worth & perceived functional ability.  PC   6. Patient will have met/partially met personal goal of: returning to walking for leisure.  PC         Goals Key:  PC= progressing/continue; PM= partially met;        DC= discontinue    PLAN     Continue Plan of Care (POC) and progress per patient tolerance. See treatment section for details on planned progressions next session.      Efrain Perales, PT

## 2022-02-15 ENCOUNTER — CLINICAL SUPPORT (OUTPATIENT)
Dept: REHABILITATION | Facility: HOSPITAL | Age: 47
End: 2022-02-15
Payer: COMMERCIAL

## 2022-02-15 DIAGNOSIS — M25.661 JOINT STIFFNESS OF KNEE, RIGHT: ICD-10-CM

## 2022-02-15 DIAGNOSIS — M25.561 ACUTE PAIN OF RIGHT KNEE: ICD-10-CM

## 2022-02-15 PROCEDURE — 97140 MANUAL THERAPY 1/> REGIONS: CPT

## 2022-02-15 PROCEDURE — 97110 THERAPEUTIC EXERCISES: CPT

## 2022-02-15 NOTE — PROGRESS NOTES
[facility]   Physical Therapy Daily Note     Name: Lizeth Willis  Clinic Number: [id number]    Therapy Diagnosis:   Encounter Diagnoses   Name Primary?    Joint stiffness of knee, right     Acute pain of right knee      Physician: Parth Bertrand MD    Visit Date: 2/15/2022    Physician Orders: PT Eval and Treat  Medical Diagnosis from Referral: Rupture of anterior cruciate ligament of right knee, initial encounter [S83.511A]  Evaluation Date: 11/10/2021  Authorization Period Expiration: 11/03/2022  Plan of Care Expiration: 06/01/2022  Progress Note Due: 03/02/2022  Visit # / Visits authorized: 5/20 (New Year) - 14 Episode Visits  FOTO: 2/ 3      Precautions: Standard    PTA Visit #: 0/5     Time In: 8:45 am  Time Out: 9:30 am    Total Billable Time: 40 minutes     SUBJECTIVE     Patient reports: no adverse reactions to last treatment session    He/She was compliant with home exercise program.  Response to previous treatment: minimal to no muscle soreness  Functional change: some improvement in extension and with walking her dog    Pain: 0/10     Location: right knee    OBJECTIVE     Objective Measures updated at progress report unless specified.     TREATMENT     Lizeth received the treatments listed below:     MANUAL THERAPY TECHNIQUES including Joint mobilizations were applied to right knee for 8 minutes.    Manual Intervention Performed Today    Instrument Assisted Soft-Tissue Mobilization  x left hamstrings, left quadriceps, left peripatellar region    Joint Mobilizations x Tibiofemoral extension ; patellar mobilization all directions   Mobilization with movement          Functional Dry Needling       Plan for Next Visit: prn       THERAPEUTIC EXERCISES to develop  strength, endurance, ROM, flexibility, posture and core stabilization for 32 minutes including assessment, patient education, and the following exercises.    Intervention    Performed Today Sets/Reps/Resistance    "  Upright Bike x 5 min - Level 5 - Lower Extremity endurance and strength   Supine Hangs - Chair Hang  5 min - 5# KB   Standing Wall Clamshell  2x10 - Yellow Theraband    Quad Sets  30x - 3 seconds    Nautilus Leg Press x 3x10 - 3 Plates   Step Ups + KB Contralateral  x 2x12 - 8" - 15# KB    Step Down - heel tap  4" Step - 3x8   Monster Walks x 3 Laps Black Theraband ~ 20 feet    Lateral Band Walks x 3 Laps - Black Theraband - ~20 feet    Single Leg Squat to Box x 22" - 3x8 - without kickstand    Squats to Box + Theraband + Kettlebell  3x10 - Blue Theraband - 20" - 15# KB   Single Leg Balance + Kettlebell Pass  2x20    Single Leg Balance + MOBO X  x 2x30" - Evens and Odds    Slider Lunges  x 2x10 - bilateral      Plan for Next Visit:        PATIENT EDUCATION AND HOME EXERCISES     Home Exercises Provided and Patient Education Provided     Education provided: (included in therex) minutes  Importance of achieving full knee extension during gait  Progression of home exercise program     Written Home Exercises Provided: continue prior home exercise program .  Exercises were reviewed and Lizeth was able to demonstrate them prior to the end of the session.  Lizeth demonstrated good  understanding of the education provided. See EMR under Patient Instructions for exercises provided during therapy sessions.      ASSESSMENT     Patient tolerated the addition of all exercises with reports of only muscle fatigue. Addition of leg press in order to promote lower extremity strength. Increased discomfort noted in the are of the right fat pad - reduction of symptoms noted with Instrument Assisted Soft-Tissue Mobilization. Addition of reverse slider lunges in order to promote lower extremity strength and coordination. Continue to progress plan of care as tolerated.    Lizeth is progressing well towards her goals.     Pt prognosis is Excellent.     Pt will continue to benefit from skilled outpatient physical therapy to address " the deficits listed in the problem list box on initial evaluation, provide pt/family education and to maximize pt's level of independence in the home and community environment.     Pt's spiritual, cultural and educational needs considered and pt agreeable to plan of care and goals.     Anticipated Barriers for therapy: coping style    SHORT TERM GOALS:  4 weeks 11/10/2021   1. Recent signs and systems trend is improving in order to progress towards LTG's.  PC   2. Patient will be independent with HEP in order to further progress and return to maximal function.  PC   3. Pain rating at Worst: 5/10 in order to progress towards increased independence with activity.  PC   4. Patient will be able to correct postural deviations in sitting and standing, to decrease pain and promote postural awareness for injury prevention.   PC      LONG TERM GOALS: 8 weeks 11/10/2021   1. Patient will return to normal ADL, recreational, and work related activities with less pain and limitation.   PC   2. Patient will improve AROM to stated goals in order to return to maximal functional potential.   PC   3. Patient will improve Strength to stated goals of appropriate musculature in order to improve functional independence.   PC   4. Pain Rating at Best: 1/10 to improve Quality of Life.   PC   5. Patient will meet predicted functional outcome (FOTO) score: 78% to increase self-worth & perceived functional ability.  PC   6. Patient will have met/partially met personal goal of: returning to walking for leisure.  PC         Goals Key:  PC= progressing/continue; PM= partially met;        DC= discontinue    PLAN     Continue Plan of Care (POC) and progress per patient tolerance. See treatment section for details on planned progressions next session.      Efrain Perales, PT

## 2022-02-22 ENCOUNTER — CLINICAL SUPPORT (OUTPATIENT)
Dept: REHABILITATION | Facility: HOSPITAL | Age: 47
End: 2022-02-22
Payer: COMMERCIAL

## 2022-02-22 DIAGNOSIS — M25.661 JOINT STIFFNESS OF KNEE, RIGHT: Primary | ICD-10-CM

## 2022-02-22 DIAGNOSIS — M25.561 ACUTE PAIN OF RIGHT KNEE: ICD-10-CM

## 2022-02-22 PROCEDURE — 97110 THERAPEUTIC EXERCISES: CPT

## 2022-03-08 ENCOUNTER — CLINICAL SUPPORT (OUTPATIENT)
Dept: REHABILITATION | Facility: HOSPITAL | Age: 47
End: 2022-03-08
Payer: COMMERCIAL

## 2022-03-08 DIAGNOSIS — M25.561 ACUTE PAIN OF RIGHT KNEE: ICD-10-CM

## 2022-03-08 DIAGNOSIS — M25.661 JOINT STIFFNESS OF KNEE, RIGHT: Primary | ICD-10-CM

## 2022-03-08 PROCEDURE — 97110 THERAPEUTIC EXERCISES: CPT

## 2022-03-08 NOTE — PROGRESS NOTES
[facility]   Physical Therapy Progress Note     Name: Lizeth Willis  Clinic Number: [id number]    Therapy Diagnosis:   Encounter Diagnoses   Name Primary?    Joint stiffness of knee, right Yes    Acute pain of right knee      Physician: Parth Bertrand MD    Visit Date: 3/8/2022    Physician Orders: PT Eval and Treat  Medical Diagnosis from Referral: Rupture of anterior cruciate ligament of right knee, initial encounter [S83.511A]  Evaluation Date: 11/10/2021  Authorization Period Expiration: 11/03/2022  Plan of Care Expiration: 06/01/2022  Progress Note Due: 04/07/2022  Visit # / Visits authorized: 7/20 (New Year) - 17 Episode Visits  FOTO: 2/ 3      Precautions: Standard    PTA Visit #: 0/5     Time In: 8:45 am  Time Out: 9:34 am    Total Billable Time: 40 minutes     SUBJECTIVE     Patient reports: she feels good overall however she continues to notice some issues with swelling.    He/She was compliant with home exercise program.  Response to previous treatment: minimal to no muscle soreness  Functional change: some improvement in extension and with walking her dog    Pain: 0/10     Location: right knee    OBJECTIVE     Objective Measures updated at progress report unless specified.     Range of Motion:      Knee Right active Right Passive Left Active Left passive Goal   Flexion 125 130 145 147 135 deg.   Extension 3 degrees hyper 3 degrees hyper 3 degrees hyper 3 degrees hyper 0 deg.         Lower Extremity Strength:     Right LE   Left LE   Goal   Knee extension: 4-/5 Knee extension: 4/5 5/5   Knee flexion: 4-/5 Knee flexion: 4/5 5/5   Hip flexion: 4-/5 Hip flexion: 4/5 5/5   Hip extension:  4-/5 Hip extension: 4/5 5/5   Hip abduction: 4-/5 Hip abduction: 4-/5 5/5           TREATMENT     Lizeth received the treatments listed below:     MANUAL THERAPY TECHNIQUES including Joint mobilizations were applied to right knee for 0 minutes.    Manual Intervention Performed Today   "  Instrument Assisted Soft-Tissue Mobilization   left hamstrings, left quadriceps, left peripatellar region    Joint Mobilizations  Tibiofemoral extension ; patellar mobilization all directions   Mobilization with movement          Functional Dry Needling       Plan for Next Visit: prn       THERAPEUTIC EXERCISES to develop  strength, endurance, ROM, flexibility, posture and core stabilization for 40 minutes including assessment, patient education, and the following exercises.    Intervention    Performed Today Sets/Reps/Resistance     Elliptical x 5 min - Level 5 - Lower Extremity endurance and strength   Treadmill Walk - OFF x 3x20    Standing Wall Clamshell  2x10 - Yellow Theraband    Quad Sets + Terminal Knee x 30x - 3 seconds - Pink Sports Cord   Nautilus Leg Press x 10/8/6 - 5/6/7 Plates   Step Ups + KB Contralateral   3x12 - 8" - 15# KB    LAQ + Hold x 3x10 - 9# - 3 second holds    Monster Walks x 3 Laps Black Theraband ~ 20 feet    Lateral Band Walks x 3 Laps - Black Theraband - ~20 feet    Single Leg Squat to Chair x 3x12 - kickstand use on up only    Speed Walking x 4 Laps on Turf   Squats to Box + Theraband + Kettlebell  3x10 - Blue Theraband - 20" - 15# KB   Single Leg Balance + Kettlebell Pass  2x20    Single Leg Balance + MOBO    3x30" - Evens and Odds    Slider Lunges   2x10 - bilateral      Plan for Next Visit:        PATIENT EDUCATION AND HOME EXERCISES     Home Exercises Provided and Patient Education Provided     Education provided: (included in therex) minutes  Importance of achieving full knee extension during gait  Progression of home exercise program     Written Home Exercises Provided: continue prior home exercise program .  Exercises were reviewed and Lizeth was able to demonstrate them prior to the end of the session.  Lizeth demonstrated good  understanding of the education provided. See EMR under Patient Instructions for exercises provided during therapy sessions.      ASSESSMENT " "    Lizeth tolerated all exercises with reports of muscle fatigue. Patient reports of minimal "pressure" when achieving full knee extension. PT believes that this is the primary reason that patient avoids full knee extension with gait. Continue to progress plan of care as tolerated with continued emphasis on improving quadriceps strength, functional mobility, and normalization of gait. Continue to address limitations listed above.    Lizeth is progressing well towards her goals.     Pt prognosis is Excellent.     Pt will continue to benefit from skilled outpatient physical therapy to address the deficits listed in the problem list box on initial evaluation, provide pt/family education and to maximize pt's level of independence in the home and community environment.     Pt's spiritual, cultural and educational needs considered and pt agreeable to plan of care and goals.     Anticipated Barriers for therapy: coping style    SHORT TERM GOALS:  4 weeks 11/10/2021   1. Recent signs and systems trend is improving in order to progress towards LTG's.  PM   2. Patient will be independent with HEP in order to further progress and return to maximal function.  MET   3. Pain rating at Worst: 5/10 in order to progress towards increased independence with activity.  MET   4. Patient will be able to correct postural deviations in sitting and standing, to decrease pain and promote postural awareness for injury prevention.   MET      LONG TERM GOALS: 8 weeks 11/10/2021   1. Patient will return to normal ADL, recreational, and work related activities with less pain and limitation.   MET   2. Patient will improve AROM to stated goals in order to return to maximal functional potential.   PM   3. Patient will improve Strength to stated goals of appropriate musculature in order to improve functional independence.   PM   4. Pain Rating at Best: 1/10 to improve Quality of Life.   MET   5. Patient will meet predicted functional outcome " (FOTO) score: 78% to increase self-worth & perceived functional ability.  PM   6. Patient will have met/partially met personal goal of: returning to walking for leisure.  PM         Goals Key:  PC= progressing/continue; PM= partially met;        DC= discontinue    PLAN     Continue Plan of Care (POC) and progress per patient tolerance. See treatment section for details on planned progressions next session.      Efrain Perales, PT

## 2022-04-01 ENCOUNTER — CLINICAL SUPPORT (OUTPATIENT)
Dept: REHABILITATION | Facility: HOSPITAL | Age: 47
End: 2022-04-01
Payer: COMMERCIAL

## 2022-04-01 DIAGNOSIS — M25.661 JOINT STIFFNESS OF KNEE, RIGHT: Primary | ICD-10-CM

## 2022-04-01 DIAGNOSIS — M25.561 ACUTE PAIN OF RIGHT KNEE: ICD-10-CM

## 2022-04-01 PROCEDURE — 97110 THERAPEUTIC EXERCISES: CPT

## 2022-04-01 NOTE — PROGRESS NOTES
[facility]   Physical Therapy Daily Note     Name: Lizeth Willis  Clinic Number: [id number]    Therapy Diagnosis:   Encounter Diagnoses   Name Primary?    Joint stiffness of knee, right Yes    Acute pain of right knee      Physician: Parth Bertrand MD    Visit Date: 4/1/2022    Physician Orders: PT Eval and Treat  Medical Diagnosis from Referral: Rupture of anterior cruciate ligament of right knee, initial encounter [S83.511A]  Evaluation Date: 11/10/2021  Authorization Period Expiration: 11/03/2022  Plan of Care Expiration: 06/01/2022  Progress Note Due: 04/07/2022  Visit # / Visits authorized: 8/20 (New Year) - 18 Episode Visits  FOTO: 3/ 3      Precautions: Standard    PTA Visit #: 0/5     Time In: 7:53 am  Time Out: 8:44 am    Total Billable Time: 45 minutes     SUBJECTIVE     Patient reports: that overall her knee feels good - no adverse reactions to last therapy session.    He/She was compliant with home exercise program.  Response to previous treatment: minimal to no muscle soreness  Functional change: some improvement in extension and with walking her dog    Pain: 0/10     Location: right knee    OBJECTIVE     Objective Measures updated at progress report unless specified.         TREATMENT     Lizeth received the treatments listed below:     MANUAL THERAPY TECHNIQUES including Joint mobilizations were applied to right knee for 0 minutes.    Manual Intervention Performed Today    Instrument Assisted Soft-Tissue Mobilization   left hamstrings, left quadriceps, left peripatellar region    Joint Mobilizations  Tibiofemoral extension ; patellar mobilization all directions   Mobilization with movement          Functional Dry Needling       Plan for Next Visit: prn       THERAPEUTIC EXERCISES to develop  strength, endurance, ROM, flexibility, posture and core stabilization for 45 minutes including assessment, patient education, and the following exercises.    Intervention     "Performed Today Sets/Reps/Resistance     Elliptical x 5 min - Level 5 - Lower Extremity endurance and strength   [place] Split Squat  x 3x8 - Rear foot on 8" Step   Nautilus Knee Extension x 3x10 - Eccentric - 2 Plates   Standing Wall Clamshell  2x10 - Yellow Theraband    Quad Sets + Terminal Knee x 30x - 3 seconds - Pink Sports Cord   Nautilus Leg Press x 12/8/6 - 4/5/7 Plates   Step Ups  x 2x12 - 12"    LAQ + Hold  3x10 - 9# - 3 second holds    Monster Walks x 3 Laps Black Theraband ~ 20 feet    Lateral Band Walks x 3 Laps - Black Theraband - ~20 feet    Single Leg Squat to Chair x 2x8 - kickstand use on up only    Speed Walking  4 Laps on Turf   Squats to Box + Theraband + Kettlebell  3x10 - Blue Theraband - 20" - 15# KB   Single Leg Balance + Kettlebell Pass  2x20    Single Leg Balance + MOBO x   3x30" - Evens and Odds    Slider Lunges   2x10 - bilateral      Plan for Next Visit:        PATIENT EDUCATION AND HOME EXERCISES     Home Exercises Provided and Patient Education Provided     Education provided: (included in therex) minutes  Importance of achieving full knee extension during gait  Progression of home exercise program     Written Home Exercises Provided: continue prior home exercise program .  Exercises were reviewed and Lizeth was able to demonstrate them prior to the end of the session.  Lizeth demonstrated good  understanding of the education provided. See EMR under Patient Instructions for exercises provided during therapy sessions.      ASSESSMENT     Lizeth tolerated all exercises with reports of muscle fatigue. Patient reports of minimal "pressure" when achieving full knee extension. Addition of [place] split squats to today's session to improve patient's lower extremity strength and hip activation. Patient able to eccentrically lower without kickstand during today's session. Continue to progress plan of care as tolerated.    Lizeth is progressing well towards her goals.     Pt " prognosis is Excellent.     Pt will continue to benefit from skilled outpatient physical therapy to address the deficits listed in the problem list box on initial evaluation, provide pt/family education and to maximize pt's level of independence in the home and community environment.     Pt's spiritual, cultural and educational needs considered and pt agreeable to plan of care and goals.     Anticipated Barriers for therapy: coping style    SHORT TERM GOALS:  4 weeks 11/10/2021   1. Recent signs and systems trend is improving in order to progress towards LTG's.  PM   2. Patient will be independent with HEP in order to further progress and return to maximal function.  MET   3. Pain rating at Worst: 5/10 in order to progress towards increased independence with activity.  MET   4. Patient will be able to correct postural deviations in sitting and standing, to decrease pain and promote postural awareness for injury prevention.   MET      LONG TERM GOALS: 8 weeks 11/10/2021   1. Patient will return to normal ADL, recreational, and work related activities with less pain and limitation.   MET   2. Patient will improve AROM to stated goals in order to return to maximal functional potential.   PM   3. Patient will improve Strength to stated goals of appropriate musculature in order to improve functional independence.   PM   4. Pain Rating at Best: 1/10 to improve Quality of Life.   MET   5. Patient will meet predicted functional outcome (FOTO) score: 78% to increase self-worth & perceived functional ability.  MET   6. Patient will have met/partially met personal goal of: returning to walking for leisure.  MET         Goals Key:  PC= progressing/continue; PM= partially met;        DC= discontinue    PLAN     Continue Plan of Care (POC) and progress per patient tolerance. See treatment section for details on planned progressions next session.      Efrain Perales, PT

## 2022-04-12 ENCOUNTER — CLINICAL SUPPORT (OUTPATIENT)
Dept: REHABILITATION | Facility: HOSPITAL | Age: 47
End: 2022-04-12
Payer: COMMERCIAL

## 2022-04-12 DIAGNOSIS — M25.661 JOINT STIFFNESS OF KNEE, RIGHT: Primary | ICD-10-CM

## 2022-04-12 DIAGNOSIS — M25.561 ACUTE PAIN OF RIGHT KNEE: ICD-10-CM

## 2022-04-12 PROCEDURE — 97110 THERAPEUTIC EXERCISES: CPT

## 2022-04-12 NOTE — PROGRESS NOTES
[facility]   Physical Therapy Daily Note     Name: Lizeth Willis  Clinic Number: [id number]    Therapy Diagnosis:   Encounter Diagnoses   Name Primary?    Joint stiffness of knee, right Yes    Acute pain of right knee      Physician: Parth Bertrand MD    Visit Date: 4/12/2022    Physician Orders: PT Eval and Treat  Medical Diagnosis from Referral: Rupture of anterior cruciate ligament of right knee, initial encounter [S83.511A]  Evaluation Date: 11/10/2021  Authorization Period Expiration: 11/03/2022  Plan of Care Expiration: 06/01/2022  Progress Note Due: 05/12/2022  Visit # / Visits authorized: 9/20 (New Year) - 19 Episode Visits  FOTO: 3/ 3      Precautions: Standard    PTA Visit #: 0/5     Time In: 9:30 am  Time Out: 10:15 am    Total Billable Time: 38 minutes     SUBJECTIVE     Patient reports: that overall her knee feels good - no adverse reactions to last therapy session.    He/She was compliant with home exercise program.  Response to previous treatment: minimal to no muscle soreness  Functional change: some improvement in extension and with walking her dog    Pain: 0/10     Location: right knee    OBJECTIVE     Objective Measures updated at progress report unless specified.     Range of Motion:      Knee Right active Right Passive Left Active Left passive Goal   Flexion 130 132 145 147 135 deg.   Extension 3 degrees hyper 3 degrees hyper 3 degrees hyper 3 degrees hyper 0 deg.         Lower Extremity Strength:     Right LE   Left LE   Goal   Knee extension: 4-/5 Knee extension: 4/5 5/5   Knee flexion: 4-/5 Knee flexion: 4/5 5/5   Hip flexion: 4-/5 Hip flexion: 4/5 5/5   Hip extension:  4-/5 Hip extension: 4/5 5/5   Hip abduction: 4-/5 Hip abduction: 4-/5 5/5       TREATMENT     Lizeth received the treatments listed below:     MANUAL THERAPY TECHNIQUES including Joint mobilizations were applied to right knee for 0 minutes.    Manual Intervention Performed Today   "  Instrument Assisted Soft-Tissue Mobilization   left hamstrings, left quadriceps, left peripatellar region    Joint Mobilizations  Tibiofemoral extension ; patellar mobilization all directions   Mobilization with movement          Functional Dry Needling       Plan for Next Visit: prn       THERAPEUTIC EXERCISES to develop  strength, endurance, ROM, flexibility, posture and core stabilization for 38 minutes including assessment, patient education, and the following exercises.    Intervention    Performed Today Sets/Reps/Resistance     Elliptical x 5 min - Level 5 - Lower Extremity endurance and strength   [place]ian Split Squat  x 3x8 - Rear foot on 12" Step - bilateral    Nautilus Knee Extension x 3x12 - Eccentric - 1 Plates   Standing Wall Clamshell  2x10 - Yellow Theraband    Quad Sets + Terminal Knee  30x - 3 seconds - Pink Sports Cord   Nautilus Leg Press x 12/8/6 - 5/6/7 Plates   Step Ups  x 2x12 - 12"    LAQ + Hold  3x10 - 9# - 3 second holds    Monster Walks x 3 Laps Black Theraband ~ 20 feet    Lateral Band Walks x 3 Laps - Black Theraband - ~20 feet    Single Leg Squat to Chair x 3x8 - kickstand use on up only    Speed Walking  4 Laps on Turf   Squats to Box + Theraband + Kettlebell  3x10 - Blue Theraband - 20" - 15# KB   Single Leg Balance + Kettlebell Pass  2x20    Single Leg Balance + MOBO + Cone Taps x   3x30" - Evens and Odds    Slider Lunges   2x10 - bilateral      Plan for Next Visit:        PATIENT EDUCATION AND HOME EXERCISES     Home Exercises Provided and Patient Education Provided     Education provided: (included in therex) minutes  Importance of achieving full knee extension during gait  Progression of home exercise program     Written Home Exercises Provided: continue prior home exercise program .  Exercises were reviewed and Lizeth was able to demonstrate them prior to the end of the session.  Lizeth demonstrated good  understanding of the education provided. See EMR under Patient " Instructions for exercises provided during therapy sessions.      ASSESSMENT     Lizeth tolerated all exercises with reports of muscle fatigue. Increased resistance used during leg press in order to improve lower extremity strength. Addition of cone tapping to patient's balance exercise in order to challenge her limits to stability. Patient able to eccentrically lower without kickstand during single leg squatting. Continue to progress plan of care as tolerated.    Lizeth is progressing well towards her goals.     Pt prognosis is Excellent.     Pt will continue to benefit from skilled outpatient physical therapy to address the deficits listed in the problem list box on initial evaluation, provide pt/family education and to maximize pt's level of independence in the home and community environment.     Pt's spiritual, cultural and educational needs considered and pt agreeable to plan of care and goals.     Anticipated Barriers for therapy: coping style    SHORT TERM GOALS:  4 weeks 11/10/2021   1. Recent signs and systems trend is improving in order to progress towards LTG's.  PM   2. Patient will be independent with HEP in order to further progress and return to maximal function.  MET   3. Pain rating at Worst: 5/10 in order to progress towards increased independence with activity.  MET   4. Patient will be able to correct postural deviations in sitting and standing, to decrease pain and promote postural awareness for injury prevention.   MET      LONG TERM GOALS: 8 weeks 11/10/2021   1. Patient will return to normal ADL, recreational, and work related activities with less pain and limitation.   MET   2. Patient will improve AROM to stated goals in order to return to maximal functional potential.   PM   3. Patient will improve Strength to stated goals of appropriate musculature in order to improve functional independence.   PM   4. Pain Rating at Best: 1/10 to improve Quality of Life.   MET   5. Patient will meet  predicted functional outcome (FOTO) score: 78% to increase self-worth & perceived functional ability.  MET   6. Patient will have met/partially met personal goal of: returning to walking for leisure.  MET         Goals Key:  PC= progressing/continue; PM= partially met;        DC= discontinue    PLAN     Continue Plan of Care (POC) and progress per patient tolerance. See treatment section for details on planned progressions next session.      Efrain Perales, PT

## 2022-04-26 ENCOUNTER — CLINICAL SUPPORT (OUTPATIENT)
Dept: REHABILITATION | Facility: HOSPITAL | Age: 47
End: 2022-04-26
Payer: COMMERCIAL

## 2022-04-26 DIAGNOSIS — M25.661 JOINT STIFFNESS OF KNEE, RIGHT: Primary | ICD-10-CM

## 2022-04-26 DIAGNOSIS — M25.561 ACUTE PAIN OF RIGHT KNEE: ICD-10-CM

## 2022-04-26 PROCEDURE — 97110 THERAPEUTIC EXERCISES: CPT

## 2022-04-26 NOTE — PROGRESS NOTES
[facility]   Physical Therapy Discharge Note     Name: Lizeth Willis  Clinic Number: [id number]    Therapy Diagnosis:   Encounter Diagnoses   Name Primary?    Joint stiffness of knee, right Yes    Acute pain of right knee      Physician: Parth Bertrand MD    Visit Date: 4/26/2022    Physician Orders: PT Eval and Treat  Medical Diagnosis from Referral: Rupture of anterior cruciate ligament of right knee, initial encounter [S83.511A]  Evaluation Date: 11/10/2021  Authorization Period Expiration: 11/03/2022  Plan of Care Expiration: 06/01/2022  Progress Note Due: 05/12/2022  Visit # / Visits authorized: 9/20 (New Year) - 19 Episode Visits  FOTO: 3/ 3      Precautions: Standard    PTA Visit #: 0/5     Time In: 8:45 am  Time Out: 9:30 am    Total Billable Time: 38 minutes     SUBJECTIVE     Patient reports: she is doing well overall - she believes that today will be her last therapy session and wants to try and discharge to home exercise program.    He/She was compliant with home exercise program.  Response to previous treatment: minimal to no muscle soreness  Functional change: some improvement in extension and with walking her dog    Pain: 0/10     Location: right knee    OBJECTIVE     Objective Measures updated at progress report unless specified.         Range of Motion:      Knee Right active Right Passive Left Active Left passive Goal   Flexion 135 140 145 147 135 deg.   Extension 3 degrees hyper 3 degrees hyper 3 degrees hyper 3 degrees hyper 0 deg.         Lower Extremity Strength:     Right LE   Left LE   Goal   Knee extension: 5/5 Knee extension: 5/5 5/5   Knee flexion: 5/5 Knee flexion: 5/5 5/5   Hip flexion: 5/5 Hip flexion: 5/5 5/5   Hip extension:  4-/5 Hip extension: 4/5 5/5   Hip abduction: 4-/5 Hip abduction: 4-/5 5/5       TREATMENT     Lizeth received the treatments listed below:     MANUAL THERAPY TECHNIQUES including Joint mobilizations were applied to right  "knee for 0 minutes.    Manual Intervention Performed Today    Instrument Assisted Soft-Tissue Mobilization   left hamstrings, left quadriceps, left peripatellar region    Joint Mobilizations  Tibiofemoral extension ; patellar mobilization all directions   Mobilization with movement          Functional Dry Needling       Plan for Next Visit: prn       THERAPEUTIC EXERCISES to develop  strength, endurance, ROM, flexibility, posture and core stabilization for 38 minutes including assessment, patient education, and the following exercises.    Intervention    Performed Today Sets/Reps/Resistance     Elliptical x 5 min - Level 5 - Lower Extremity endurance and strength   [place]zech Split Squat  x 3x8 - Rear foot on 12" Step - bilateral    Nautilus Knee Extension x 3x12 - Eccentric - 1 Plates   Standing Wall Clamshell  2x10 - Yellow Theraband    Quad Sets + Terminal Knee  30x - 3 seconds - Pink Sports Cord   Nautilus Leg Press  12/8/6 - 5/6/7 Plates   Step Ups  x 3x8 - 6" - 20# KB     Monster Walks x 3 Laps Black Theraband ~ 20 feet    Lateral Band Walks x 3 Laps - Black Theraband - ~20 feet    Single Leg Squat to Chair x 3x8 - kickstand use on up only    Speed Walking  4 Laps on Turf   Squats to Box + Theraband + Kettlebell  3x10 - Blue Theraband - 20" - 15# KB   Single Leg Balance + Kettlebell Pass  2x20    Single Leg Balance + MOBO + Cone Taps x   3x30" - Evens and Odds    Slider Lunges   2x10 - bilateral      Plan for Next Visit:        PATIENT EDUCATION AND HOME EXERCISES     Home Exercises Provided and Patient Education Provided     Education provided: (included in therex) minutes  Importance of achieving full knee extension during gait  Progression of home exercise program     Written Home Exercises Provided: continue prior home exercise program .  Exercises were reviewed and Lizeth was able to demonstrate them prior to the end of the session.  Lizeth demonstrated good  understanding of the education provided. " See EMR under Patient Instructions for exercises provided during therapy sessions.      ASSESSMENT     Lizeth tolerated all exercises with reports of muscle fatigue. Lizeth to be discharged today to home exercise program having met or partially met all of her goals.    Lizeth is progressing well towards her goals.     Pt prognosis is Excellent.     Pt will continue to benefit from skilled outpatient physical therapy to address the deficits listed in the problem list box on initial evaluation, provide pt/family education and to maximize pt's level of independence in the home and community environment.     Pt's spiritual, cultural and educational needs considered and pt agreeable to plan of care and goals.     Anticipated Barriers for therapy: coping style    SHORT TERM GOALS:  4 weeks 11/10/2021   1. Recent signs and systems trend is improving in order to progress towards LTG's.  MET   2. Patient will be independent with HEP in order to further progress and return to maximal function.  MET   3. Pain rating at Worst: 5/10 in order to progress towards increased independence with activity.  MET   4. Patient will be able to correct postural deviations in sitting and standing, to decrease pain and promote postural awareness for injury prevention.   MET      LONG TERM GOALS: 8 weeks 11/10/2021   1. Patient will return to normal ADL, recreational, and work related activities with less pain and limitation.   MET   2. Patient will improve AROM to stated goals in order to return to maximal functional potential.   MET   3. Patient will improve Strength to stated goals of appropriate musculature in order to improve functional independence.   PM   4. Pain Rating at Best: 1/10 to improve Quality of Life.   MET   5. Patient will meet predicted functional outcome (FOTO) score: 78% to increase self-worth & perceived functional ability.  MET   6. Patient will have met/partially met personal goal of: returning to walking for  leisure.  MET         Goals Key:  PC= progressing/continue; PM= partially met;        DC= discontinue    PLAN     Discharged to home exercise program - 04/26/2022    Efrain Perales PT